# Patient Record
Sex: FEMALE | Race: BLACK OR AFRICAN AMERICAN | NOT HISPANIC OR LATINO | URBAN - METROPOLITAN AREA
[De-identification: names, ages, dates, MRNs, and addresses within clinical notes are randomized per-mention and may not be internally consistent; named-entity substitution may affect disease eponyms.]

---

## 2017-12-22 ENCOUNTER — EMERGENCY (EMERGENCY)
Facility: HOSPITAL | Age: 57
LOS: 1 days | Discharge: HOME | End: 2017-12-22
Attending: EMERGENCY MEDICINE | Admitting: EMERGENCY MEDICINE
Payer: SELF-PAY

## 2017-12-22 VITALS
SYSTOLIC BLOOD PRESSURE: 212 MMHG | DIASTOLIC BLOOD PRESSURE: 65 MMHG | OXYGEN SATURATION: 100 % | RESPIRATION RATE: 20 BRPM | HEART RATE: 51 BPM | TEMPERATURE: 98 F

## 2017-12-22 DIAGNOSIS — Z98.890 OTHER SPECIFIED POSTPROCEDURAL STATES: Chronic | ICD-10-CM

## 2017-12-22 DIAGNOSIS — Z94.0 KIDNEY TRANSPLANT STATUS: Chronic | ICD-10-CM

## 2017-12-22 DIAGNOSIS — E89.2 POSTPROCEDURAL HYPOPARATHYROIDISM: Chronic | ICD-10-CM

## 2017-12-22 LAB
ALBUMIN SERPL ELPH-MCNC: 3.6 G/DL — SIGNIFICANT CHANGE UP (ref 3.3–5)
ALP SERPL-CCNC: 68 U/L — SIGNIFICANT CHANGE UP (ref 40–120)
ALT FLD-CCNC: 13 U/L — SIGNIFICANT CHANGE UP (ref 4–33)
APPEARANCE UR: CLEAR — SIGNIFICANT CHANGE UP
AST SERPL-CCNC: 24 U/L — SIGNIFICANT CHANGE UP (ref 4–32)
BASE EXCESS BLDV CALC-SCNC: 2.4 MMOL/L — SIGNIFICANT CHANGE UP
BASOPHILS # BLD AUTO: 0.03 K/UL — SIGNIFICANT CHANGE UP (ref 0–0.2)
BASOPHILS NFR BLD AUTO: 0.5 % — SIGNIFICANT CHANGE UP (ref 0–2)
BILIRUB SERPL-MCNC: 0.4 MG/DL — SIGNIFICANT CHANGE UP (ref 0.2–1.2)
BILIRUB UR-MCNC: NEGATIVE — SIGNIFICANT CHANGE UP
BLOOD GAS VENOUS - CREATININE: 0.94 MG/DL — SIGNIFICANT CHANGE UP (ref 0.5–1.3)
BLOOD UR QL VISUAL: NEGATIVE — SIGNIFICANT CHANGE UP
BUN SERPL-MCNC: 17 MG/DL — SIGNIFICANT CHANGE UP (ref 7–23)
CALCIUM SERPL-MCNC: 9.9 MG/DL — SIGNIFICANT CHANGE UP (ref 8.4–10.5)
CHLORIDE BLDV-SCNC: 108 MMOL/L — SIGNIFICANT CHANGE UP (ref 96–108)
CHLORIDE SERPL-SCNC: 107 MMOL/L — SIGNIFICANT CHANGE UP (ref 98–107)
CK MB BLD-MCNC: 1.66 NG/ML — SIGNIFICANT CHANGE UP (ref 1–4.7)
CK MB BLD-MCNC: SIGNIFICANT CHANGE UP (ref 0–2.5)
CK SERPL-CCNC: 68 U/L — SIGNIFICANT CHANGE UP (ref 25–170)
CK SERPL-CCNC: 94 U/L — SIGNIFICANT CHANGE UP (ref 25–170)
CO2 SERPL-SCNC: 26 MMOL/L — SIGNIFICANT CHANGE UP (ref 22–31)
COLOR SPEC: YELLOW — SIGNIFICANT CHANGE UP
CREAT SERPL-MCNC: 1 MG/DL — SIGNIFICANT CHANGE UP (ref 0.5–1.3)
EOSINOPHIL # BLD AUTO: 0.15 K/UL — SIGNIFICANT CHANGE UP (ref 0–0.5)
EOSINOPHIL NFR BLD AUTO: 2.3 % — SIGNIFICANT CHANGE UP (ref 0–6)
GAS PNL BLDV: 139 MMOL/L — SIGNIFICANT CHANGE UP (ref 136–146)
GLUCOSE BLDV-MCNC: 100 — HIGH (ref 70–99)
GLUCOSE SERPL-MCNC: 97 MG/DL — SIGNIFICANT CHANGE UP (ref 70–99)
GLUCOSE UR-MCNC: NEGATIVE — SIGNIFICANT CHANGE UP
HCO3 BLDV-SCNC: 25 MMOL/L — SIGNIFICANT CHANGE UP (ref 20–27)
HCT VFR BLD CALC: 37.6 % — SIGNIFICANT CHANGE UP (ref 34.5–45)
HCT VFR BLDV CALC: 38.3 % — SIGNIFICANT CHANGE UP (ref 34.5–45)
HGB BLD-MCNC: 11.9 G/DL — SIGNIFICANT CHANGE UP (ref 11.5–15.5)
HGB BLDV-MCNC: 12.5 G/DL — SIGNIFICANT CHANGE UP (ref 11.5–15.5)
IMM GRANULOCYTES # BLD AUTO: 0.02 # — SIGNIFICANT CHANGE UP
IMM GRANULOCYTES NFR BLD AUTO: 0.3 % — SIGNIFICANT CHANGE UP (ref 0–1.5)
KETONES UR-MCNC: NEGATIVE — SIGNIFICANT CHANGE UP
LACTATE BLDV-MCNC: 0.8 MMOL/L — SIGNIFICANT CHANGE UP (ref 0.5–2)
LEUKOCYTE ESTERASE UR-ACNC: NEGATIVE — SIGNIFICANT CHANGE UP
LYMPHOCYTES # BLD AUTO: 2.46 K/UL — SIGNIFICANT CHANGE UP (ref 1–3.3)
LYMPHOCYTES # BLD AUTO: 37.5 % — SIGNIFICANT CHANGE UP (ref 13–44)
MCHC RBC-ENTMCNC: 28.2 PG — SIGNIFICANT CHANGE UP (ref 27–34)
MCHC RBC-ENTMCNC: 31.6 % — LOW (ref 32–36)
MCV RBC AUTO: 89.1 FL — SIGNIFICANT CHANGE UP (ref 80–100)
MONOCYTES # BLD AUTO: 0.69 K/UL — SIGNIFICANT CHANGE UP (ref 0–0.9)
MONOCYTES NFR BLD AUTO: 10.5 % — SIGNIFICANT CHANGE UP (ref 2–14)
MUCOUS THREADS # UR AUTO: SIGNIFICANT CHANGE UP
NEUTROPHILS # BLD AUTO: 3.21 K/UL — SIGNIFICANT CHANGE UP (ref 1.8–7.4)
NEUTROPHILS NFR BLD AUTO: 48.9 % — SIGNIFICANT CHANGE UP (ref 43–77)
NITRITE UR-MCNC: NEGATIVE — SIGNIFICANT CHANGE UP
NON-SQ EPI CELLS # UR AUTO: <1 — SIGNIFICANT CHANGE UP
NRBC # FLD: 0 — SIGNIFICANT CHANGE UP
PCO2 BLDV: 51 MMHG — SIGNIFICANT CHANGE UP (ref 41–51)
PH BLDV: 7.35 PH — SIGNIFICANT CHANGE UP (ref 7.32–7.43)
PH UR: 6.5 — SIGNIFICANT CHANGE UP (ref 4.6–8)
PLATELET # BLD AUTO: 167 K/UL — SIGNIFICANT CHANGE UP (ref 150–400)
PMV BLD: 12.3 FL — SIGNIFICANT CHANGE UP (ref 7–13)
PO2 BLDV: 34 MMHG — LOW (ref 35–40)
POTASSIUM BLDV-SCNC: 4.5 MMOL/L — SIGNIFICANT CHANGE UP (ref 3.4–4.5)
POTASSIUM SERPL-MCNC: 4.4 MMOL/L — SIGNIFICANT CHANGE UP (ref 3.5–5.3)
POTASSIUM SERPL-SCNC: 4.4 MMOL/L — SIGNIFICANT CHANGE UP (ref 3.5–5.3)
PROT SERPL-MCNC: 7.4 G/DL — SIGNIFICANT CHANGE UP (ref 6–8.3)
PROT UR-MCNC: 500 MG/DL — HIGH
RBC # BLD: 4.22 M/UL — SIGNIFICANT CHANGE UP (ref 3.8–5.2)
RBC # FLD: 14.5 % — SIGNIFICANT CHANGE UP (ref 10.3–14.5)
RBC CASTS # UR COMP ASSIST: SIGNIFICANT CHANGE UP (ref 0–?)
SAO2 % BLDV: 61.2 % — SIGNIFICANT CHANGE UP (ref 60–85)
SODIUM SERPL-SCNC: 142 MMOL/L — SIGNIFICANT CHANGE UP (ref 135–145)
SP GR SPEC: 1.02 — SIGNIFICANT CHANGE UP (ref 1–1.04)
SQUAMOUS # UR AUTO: SIGNIFICANT CHANGE UP
TROPONIN T SERPL-MCNC: < 0.06 NG/ML — SIGNIFICANT CHANGE UP (ref 0–0.06)
TROPONIN T SERPL-MCNC: < 0.06 NG/ML — SIGNIFICANT CHANGE UP (ref 0–0.06)
UROBILINOGEN FLD QL: NORMAL MG/DL — SIGNIFICANT CHANGE UP
WBC # BLD: 6.56 K/UL — SIGNIFICANT CHANGE UP (ref 3.8–10.5)
WBC # FLD AUTO: 6.56 K/UL — SIGNIFICANT CHANGE UP (ref 3.8–10.5)
WBC UR QL: SIGNIFICANT CHANGE UP (ref 0–?)

## 2017-12-22 PROCEDURE — 99220: CPT

## 2017-12-22 PROCEDURE — 93970 EXTREMITY STUDY: CPT | Mod: 26

## 2017-12-22 PROCEDURE — 71020: CPT | Mod: 26

## 2017-12-22 RX ORDER — TACROLIMUS 5 MG/1
1 CAPSULE ORAL
Qty: 0 | Refills: 0 | COMMUNITY

## 2017-12-22 RX ORDER — NIFEDIPINE 30 MG
1 TABLET, EXTENDED RELEASE 24 HR ORAL
Qty: 0 | Refills: 0 | COMMUNITY

## 2017-12-22 RX ORDER — FUROSEMIDE 40 MG
1 TABLET ORAL
Qty: 0 | Refills: 0 | COMMUNITY

## 2017-12-22 RX ORDER — NIFEDIPINE 30 MG
60 TABLET, EXTENDED RELEASE 24 HR ORAL
Qty: 0 | Refills: 0 | Status: DISCONTINUED | OUTPATIENT
Start: 2017-12-22 | End: 2017-12-26

## 2017-12-22 RX ORDER — METOPROLOL TARTRATE 50 MG
1 TABLET ORAL
Qty: 0 | Refills: 0 | COMMUNITY

## 2017-12-22 RX ORDER — FUROSEMIDE 40 MG
40 TABLET ORAL
Qty: 0 | Refills: 0 | Status: DISCONTINUED | OUTPATIENT
Start: 2017-12-22 | End: 2017-12-26

## 2017-12-22 RX ORDER — METOPROLOL TARTRATE 50 MG
100 TABLET ORAL
Qty: 0 | Refills: 0 | Status: DISCONTINUED | OUTPATIENT
Start: 2017-12-22 | End: 2017-12-23

## 2017-12-22 RX ORDER — TACROLIMUS 5 MG/1
1 CAPSULE ORAL
Qty: 0 | Refills: 0 | Status: DISCONTINUED | OUTPATIENT
Start: 2017-12-22 | End: 2017-12-26

## 2017-12-22 RX ADMIN — Medication 40 MILLIGRAM(S): at 22:32

## 2017-12-22 RX ADMIN — TACROLIMUS 1 MILLIGRAM(S): 5 CAPSULE ORAL at 19:17

## 2017-12-22 NOTE — ED ADULT TRIAGE NOTE - CHIEF COMPLAINT QUOTE
hx of kidney transplant .pt reports onset this AM of dizziness, SOB  and diaphoresis, denies chest pain or syncope. pt speaking full sentences at traige denies cough or fever. LS clear = bilat.

## 2017-12-22 NOTE — ED CDU PROVIDER INITIAL DAY NOTE - PROGRESS NOTE DETAILS
This patient was signed out to me by LAUREN Herrmann and Dr. Hartley 12/22/17 at 1900 hrs.; test results / orders reviewed at sign-out.  Pt. has been resting comfortably in the interim.  CE #3 due at 0010 hrs; echo scheduled for 12/23 daytime.  CDU plan discussed with patient who verbalizes agreement with plan.  Pt stable at present; will continue to monitor / reassess.

## 2017-12-22 NOTE — ED PROVIDER NOTE - OBJECTIVE STATEMENT
56 y/o female with PMHx of HTN, kidney failure (s/p kidney transplant), presents to ED c/o lightheadedness, diaphoresis and SOB x 2 hours. Patient states she was in a meeting at work and the symptoms came on suddenly. She took her BP at that time and states it was 200/110, so she took her Lopressor, which resulted with mild improvement. Symptoms are now intermittent. Endorses left calf pain, bilateral leg swelling (left more than right) x3 weeks, chest tightness and dry cough. Denies fever, chills, N/V, chest pain, headache, congestion, palpitations, abdominal pain and urinary symptoms. denies slurred speech, change in vision, weakness, numbness, tingling.

## 2017-12-22 NOTE — ED CDU PROVIDER INITIAL DAY NOTE - ATTENDING CONTRIBUTION TO CARE
Seen and examined, NAD at time of exam, MMM, clear lungs, heart reg, abd soft, NT to palp, no redness or pain in area of transplant scar, scant edema, NT calves. Denies fever/chills, no hematuria/dysuria, no recent change in immunosuppressant meds.

## 2017-12-22 NOTE — ED ADULT NURSE NOTE - OBJECTIVE STATEMENT
Received patient to room 8 for c/o dizziness and elevated blood pressure. Pt placed on cardiac monitor with sinus bradycardia. Pt evaluated by MD. No c/o pain. IVL placed to right AC 20 gaueg and labs drawn and sent. Awaiting results and disposition.   RAS Liao

## 2017-12-22 NOTE — ED CDU PROVIDER INITIAL DAY NOTE - OBJECTIVE STATEMENT
58 y/o female with PMHx of HTN, kidney failure (s/p kidney transplant), presents to ED c/o lightheadedness, diaphoresis and SOB x 2 hours. Patient states she was in a meeting at work and the symptoms came on suddenly. She took her BP at that time and states it was 200/110, so she took her Lopressor, which resulted with mild improvement. Symptoms are now intermittent. Endorses left calf pain, bilateral leg swelling (left more than right) x3 weeks, chest tightness and dry cough. Denies fever, chills, N/V, chest pain, headache, congestion, palpitations, abdominal pain and urinary symptoms. denies slurred speech, change in vision, weakness, numbness, tingling.  ED Note Above: LAUREN Herrmann: 58 y/o female with hx HTN, Kidney Failure(hx of transplant) presents to the ER s/p near syncope episode this AM while at work.  Pt reports chest tightness.  Pt reports chronic b/l leg swelling.  PT denies chest pain, nausea, vomiting, palpitations, abdominal pain, back pain.   Pt placed in CDU for CE x 2, cardiac monitoring and echo.

## 2017-12-22 NOTE — ED PROVIDER NOTE - MEDICAL DECISION MAKING DETAILS
58 y/o female w/ near syncope, chest tightness and left leg pain/swelling- labs, Ekg, duplex, admit Precious: 58 y/o female w/ near syncope, chest tightness and left leg pain/swelling- labs, Ekg, duplex, admit

## 2017-12-23 VITALS
DIASTOLIC BLOOD PRESSURE: 80 MMHG | SYSTOLIC BLOOD PRESSURE: 142 MMHG | RESPIRATION RATE: 17 BRPM | TEMPERATURE: 98 F | HEART RATE: 62 BPM | OXYGEN SATURATION: 99 %

## 2017-12-23 PROCEDURE — 99217: CPT

## 2017-12-23 RX ORDER — METOPROLOL TARTRATE 50 MG
50 TABLET ORAL
Qty: 0 | Refills: 0 | Status: DISCONTINUED | OUTPATIENT
Start: 2017-12-23 | End: 2017-12-26

## 2017-12-23 RX ADMIN — Medication 60 MILLIGRAM(S): at 05:46

## 2017-12-23 RX ADMIN — Medication 50 MILLIGRAM(S): at 11:27

## 2017-12-23 RX ADMIN — TACROLIMUS 1 MILLIGRAM(S): 5 CAPSULE ORAL at 05:46

## 2017-12-23 NOTE — ED CDU PROVIDER SUBSEQUENT DAY NOTE - HISTORY
56 yo female, PMH of kidney failure with hx/o kidney transplant, and hx/o HTN, presented to the ED for episode of near syncope.  Pt. placed in CDU for Cardiac tele monitoring, CE #2 and #3, and echo.  In interim, pt has been resting comfortably with no c/o in interim.  Of note, pt refused CE #3; stated she had two negative cardiac enzymes and did not want CE #3 as she felt this was unnecessary.  No other issues in interim.

## 2017-12-23 NOTE — ED CDU PROVIDER SUBSEQUENT DAY NOTE - CONSTITUTIONAL, MLM
normal... Well appearing, well nourished, awake, alert, oriented to person, place, time/situation and in no apparent distress.  Pt. verbalizing in full, clear, effortless sentences.  Gait stable.

## 2017-12-23 NOTE — ED CDU PROVIDER SUBSEQUENT DAY NOTE - PROGRESS NOTE DETAILS
Pt will be signed out to day CDU PA and day CDU attending Dr. Gomez at 0700 hrs.  In interim, will continue to monitor / reassess. Pt will be signed out to day CDU PA and day CDU attending Dr. Bloch at 0700 hrs.  In interim, will continue to monitor / reassess. Patricia att: Patient signed out to me at 11am. Patient not received echo, several calls made to determine ETA. Informed by Echo Tech Devin test cannot be performed today. Issue escalated to Radiology Supervisor, per Supervisor not their department d/w Cardio. No phone # to reach Echo Supervisor, ED Nurse Manager Rafiq contacted to reach Echo supervisor for overtime coverage. Patricia att: Patient signed out to me at 11am. 12:30 Patient not received echo, several calls made to determine ETA. Informed by Echo Tech Devin test cannot be performed today. Issue escalated to Radiology Supervisor, per Supervisor not their department d/w Cardio. No phone # to reach Echo Supervisor, ED Nurse Manager Rafiq contacted to reach Echo supervisor for overtime coverage. Patient informed of delay and escalation to supervisor. Patricia att: Received call from Nursing Manager jigar Conroy/shawn Echo Supervisor, cannot arrange overtime and cannot call in a second tech. Best available option is to offer patient first Sunday AM slot. Tele doc Mahesh Bagley contacted to see next earliest available outpatient echo Tuesday, [facility] available Monday as back up options for patient. Will discuss all options with patient.

## 2017-12-23 NOTE — ED CDU PROVIDER DISPOSITION NOTE - ATTENDING CONTRIBUTION TO CARE
Dr. Gomez: I performed a face to face bedside interview with patient regarding history of present illness, review of symptoms and past medical history. I completed an independent physical exam.  I have discussed patient's plan of care with PA.   I agree with note as stated above, having amended the EMR as needed to reflect my findings.   This includes HISTORY OF PRESENT ILLNESS, HIV, PAST MEDICAL/SURGICAL/FAMILY/SOCIAL HISTORY, ALLERGIES AND HOME MEDICATIONS, REVIEW OF SYSTEMS, PHYSICAL EXAM, and any PROGRESS NOTES during the time I functioned as the attending physician for this patient. HPI above as by me. PE above as by me. DDX near syncope. nonischemic ekg, ce neg x 2, echo delayed. Patient elects to be discharged and declines arranged outpatient care. Patient opts to arrange her own echo with her provider in [place].

## 2017-12-23 NOTE — ED CDU PROVIDER DISPOSITION NOTE - PLAN OF CARE
Seen in [facility] for episodes of near-syncope (near fainting or severe dizziness). You have two negative sets of cardiac enzymes and negative ultrasound of the legs. The ultrasound of the heart could not be performed today and you have declined the first spot on tomorrow's list. Please use the attached list or contact your primary doctor to arrange an ultrasound of your heart outpatient. Please return to the nearest Emergency Room for any new or worsening symptoms.

## 2017-12-23 NOTE — ED CDU PROVIDER DISPOSITION NOTE - CLINICAL COURSE
Patricia att: 57F h/o htn s/p kidney transplant, presents to ED for near syncope, placed in CDU for tele monitoring, serial cardiac enzyme and echo. Overnight pt declined third cardiac enzyme as she felt it was unnecessary, neg events. 11AM Patient signed out to me. 12:30 Patient had not received echo, several calls made to determine ETA. 13:00 Informed by Echo Tech Devin test cannot be performed today. Issue escalated to Radiology Supervisor, per Supervisor not their department d/w Cardio. No phone # to reach Echo Supervisor, ED Nurse Manager Rafiq contacted to reach Echo supervisor for overtime coverage. Patient informed of delay and escalation to supervisor. 14:44 Received call from Nursing Manager Rafiq, d/w Echo Supervisor, cannot arrange overtime and cannot call in a second tech. Best available option is to offer patient first Sunday AM slot. Tele doc Mahesh Bagley contacted to see next earliest available outpatient echo Tuesday, [facility] available Monday as back up options for patient. Discussed options with patient. Patient declines first AM echo, or outpatient options. Displeased, “I was promised an echo.” Patient requests discharge. The patient is AAOx3, not intoxicated, and displays normal decision making ability. We discussed all risks, benefits, and alternatives to the progression of treatment. Explained potential to get worse or have second episode. The patient was instructed that they are welcome to change their decision to leave against medical advice and return to the emergency department at any time and for any reason in order to allow us to render care.

## 2023-09-30 NOTE — ED CDU PROVIDER SUBSEQUENT DAY NOTE - ATTENDING CONTRIBUTION TO CARE
,[email or web address] Dr Bloch, ATTENDING MD-  I performed a face to face bedside interview with patient regarding history of present illness, review of symptoms and past medical history. I completed an independent physical exam.  I have discussed patient's plan of care with PA.   Documentation as above in the note.